# Patient Record
Sex: MALE | Race: WHITE | Employment: FULL TIME | URBAN - METROPOLITAN AREA
[De-identification: names, ages, dates, MRNs, and addresses within clinical notes are randomized per-mention and may not be internally consistent; named-entity substitution may affect disease eponyms.]

---

## 2021-11-29 ENCOUNTER — HOSPITAL ENCOUNTER (OUTPATIENT)
Age: 56
Discharge: HOME | End: 2021-11-29
Payer: COMMERCIAL

## 2021-11-29 ENCOUNTER — HOSPITAL ENCOUNTER (OUTPATIENT)
Age: 56
Discharge: HOME | End: 2021-12-01
Payer: COMMERCIAL

## 2021-11-29 ENCOUNTER — HOSPITAL ENCOUNTER (OUTPATIENT)
Dept: GENERAL RADIOLOGY | Age: 56
Discharge: HOME | End: 2021-12-01
Payer: COMMERCIAL

## 2021-11-29 DIAGNOSIS — S70.01XS CONTUSION OF RIGHT HIP, SEQUELA: ICD-10-CM

## 2021-11-29 LAB
ALBUMIN SERPL-MCNC: 4.8 G/DL (ref 3.5–5.2)
ALP BLD-CCNC: 92 U/L (ref 40–129)
ALT SERPL-CCNC: 22 U/L (ref 0–40)
AST SERPL-CCNC: 20 U/L (ref 0–39)
BASOPHILS ABSOLUTE: 0.04 E9/L (ref 0–0.2)
BASOPHILS RELATIVE PERCENT: 0.6 % (ref 0–2)
BILIRUB SERPL-MCNC: 0.7 MG/DL (ref 0–1.2)
BILIRUBIN DIRECT: <0.2 MG/DL (ref 0–0.3)
BILIRUBIN, INDIRECT: NORMAL MG/DL (ref 0–1)
BUN BLDV-MCNC: 21 MG/DL (ref 6–20)
CHOLESTEROL, FASTING: 237 MG/DL (ref 0–199)
CREAT SERPL-MCNC: 1 MG/DL (ref 0.7–1.2)
EKG ATRIAL RATE: 87 BPM
EKG Q-T INTERVAL: 372 MS
EKG QRS DURATION: 92 MS
EKG QTC CALCULATION (BAZETT): 445 MS
EKG R AXIS: 1 DEGREES
EKG T AXIS: -12 DEGREES
EKG VENTRICULAR RATE: 86 BPM
EOSINOPHILS ABSOLUTE: 0.33 E9/L (ref 0.05–0.5)
EOSINOPHILS RELATIVE PERCENT: 4.8 % (ref 0–6)
GFR AFRICAN AMERICAN: >60
GFR NON-AFRICAN AMERICAN: >60 ML/MIN/1.73
GLUCOSE BLD-MCNC: 101 MG/DL (ref 74–99)
HCT VFR BLD CALC: 50.4 % (ref 37–54)
HDLC SERPL-MCNC: 61 MG/DL
HEMOGLOBIN: 17.1 G/DL (ref 12.5–16.5)
IMMATURE GRANULOCYTES #: 0.11 E9/L
IMMATURE GRANULOCYTES %: 1.6 % (ref 0–5)
LDL CHOLESTEROL CALCULATED: 151 MG/DL (ref 0–99)
LYMPHOCYTES ABSOLUTE: 1.93 E9/L (ref 1.5–4)
LYMPHOCYTES RELATIVE PERCENT: 27.9 % (ref 20–42)
MCH RBC QN AUTO: 29.7 PG (ref 26–35)
MCHC RBC AUTO-ENTMCNC: 33.9 % (ref 32–34.5)
MCV RBC AUTO: 87.7 FL (ref 80–99.9)
MONOCYTES ABSOLUTE: 0.61 E9/L (ref 0.1–0.95)
MONOCYTES RELATIVE PERCENT: 8.8 % (ref 2–12)
NEUTROPHILS ABSOLUTE: 3.89 E9/L (ref 1.8–7.3)
NEUTROPHILS RELATIVE PERCENT: 56.3 % (ref 43–80)
PDW BLD-RTO: 12.2 FL (ref 11.5–15)
PLATELET # BLD: 197 E9/L (ref 130–450)
PMV BLD AUTO: 8.7 FL (ref 7–12)
RBC # BLD: 5.75 E12/L (ref 3.8–5.8)
TOTAL PROTEIN: 7.8 G/DL (ref 6.4–8.3)
TRIGLYCERIDE, FASTING: 124 MG/DL (ref 0–149)
VLDLC SERPL CALC-MCNC: 25 MG/DL
WBC # BLD: 6.9 E9/L (ref 4.5–11.5)

## 2021-11-29 PROCEDURE — 36415 COLL VENOUS BLD VENIPUNCTURE: CPT

## 2021-11-29 PROCEDURE — 82947 ASSAY GLUCOSE BLOOD QUANT: CPT

## 2021-11-29 PROCEDURE — 73502 X-RAY EXAM HIP UNI 2-3 VIEWS: CPT

## 2021-11-29 PROCEDURE — 80076 HEPATIC FUNCTION PANEL: CPT

## 2021-11-29 PROCEDURE — 93005 ELECTROCARDIOGRAM TRACING: CPT | Performed by: FAMILY MEDICINE

## 2021-11-29 PROCEDURE — 82565 ASSAY OF CREATININE: CPT

## 2021-11-29 PROCEDURE — 80061 LIPID PANEL: CPT

## 2021-11-29 PROCEDURE — 85025 COMPLETE CBC W/AUTO DIFF WBC: CPT

## 2021-11-29 PROCEDURE — 84520 ASSAY OF UREA NITROGEN: CPT

## 2022-01-02 NOTE — PROGRESS NOTES
[facility] Cardiology consult  Dr. Sravanthi Murray      Reason for Consult: Atrial Fibrillation  Referring Physician: Alonso Watters DO     CHIEF COMPLAINT:   Chief Complaint   Patient presents with    Atrial Fibrillation     CONSULT PER DR. Jeri Torres due to AF and heart murmur. PAtient denies any cp sob or dizziness. HISTORY OF PRESENT ILLNESS:   60year old male with newly diagnosed atrial fibrillation is here to get established with cardiology  Patient denies any chest pain, no shortness of breath, no lightheadedness, no dizziness, no palpitations, no pedal edema, no PND, no orthopnea, no syncope, no presyncopal episodes. Functional capacity is at baseline    Past Medical History:   Diagnosis Date    Acid reflux     Anxiety     Arrhythmia     ATRIAL FIBRILLATION    Diverticulitis          Past Surgical History:   Procedure Laterality Date    BACK SURGERY      laminectomy    COLECTOMY  July 2014    Laparoscopic sigmoid colectomy    COLONOSCOPY      TONSILLECTOMY           Current Outpatient Medications   Medication Sig Dispense Refill    losartan (COZAAR) 50 MG tablet 50 mg daily       sertraline (ZOLOFT) 100 MG tablet take 1 tablet by mouth once daily      ELIQUIS 5 MG TABS tablet take 1 tablet by mouth twice a day      DEXILANT 30 MG CPDR delayed release capsule take 1 capsule by mouth every morning       No current facility-administered medications for this visit. Allergies as of 01/03/2022    (No Known Allergies)       Social History     Socioeconomic History    Marital status:      Spouse name: Not on file    Number of children: Not on file    Years of education: Not on file    Highest education level: Not on file   Occupational History    Not on file   Tobacco Use    Smoking status: Never Smoker    Smokeless tobacco: Never Used   Vaping Use    Vaping Use: Not on file   Substance and Sexual Activity    Alcohol use:  Yes     Alcohol/week: 2.0 standard drinks     Types: 2 Cans of beer per week     Comment: DRINKS DAILY 2 CANS OF BEER DAILY. 1 CUP OF COFFEE A DAY     Drug use: No    Sexual activity: Not on file   Other Topics Concern    Not on file   Social History Narrative    Not on file     Social Determinants of Health     Financial Resource Strain:     Difficulty of Paying Living Expenses: Not on file   Food Insecurity:     Worried About Running Out of Food in the Last Year: Not on file    [place]ust[place] of Food in the Last Year: Not on file   Transportation Needs:     Lack of Transportation (Medical): Not on file    Lack of Transportation (Non-Medical):  Not on file   Physical Activity:     Days of Exercise per Week: Not on file    Minutes of Exercise per Session: Not on file   Stress:     Feeling of Stress : Not on file   Social Connections:     Frequency of Communication with Friends and Family: Not on file    Frequency of Social Gatherings with Friends and Family: Not on file    Attends Sikhism Services: Not on file    Active Member of [address] [place] [address] or Organizations: Not on file    Attends Club or Organization Meetings: Not on file    Marital Status: Not on file   Intimate Partner Violence:     Fear of Current or Ex-Partner: Not on file    Emotionally Abused: Not on file    Physically Abused: Not on file    Sexually Abused: Not on file   Housing Stability:     Unable to Pay for Housing in the Last Year: Not on file    Number of [place] in the Last Year: Not on file    Unstable Housing in the Last Year: Not on file     No family history of early CAD    REVIEW OF SYSTEMS:     CONSTITUTIONAL:  negative for  fevers, chills, sweats and fatigue  EYES:  negative for  double vision, blurred vision and blind spots  HEENT:  negative for  tinnitus, earaches, nasal congestion and epistaxis  RESPIRATORY:  negative for  dry cough, cough with sputum, dyspnea, wheezing and hemoptysis  CARDIOVASCULAR: as per HPI  GASTROINTESTINAL:  negative for nausea, vomiting, diarrhea, constipation, pruritus and jaundice  GENITOURINARY:  negative for frequency, dysuria, nocturia, urinary incontinence and hesitancy  HEMATOLOGIC/LYMPHATIC:  negative for easy bruising, bleeding, lymphadenopathy and petechiae  ALLERGIC/IMMUNOLOGIC:  negative for urticaria, hay fever and angioedema  ENDOCRINE:  negative for heat intolerance, cold intolerance, tremor, hair loss and diabetic symptoms including neither polyuria nor polydipsia nor blurred vision  MUSCULOSKELETAL:  negative for  myalgias, arthralgias, joint swelling, stiff joints and decreased range of motion  NEUROLOGICAL:  negative for memory problems, speech problems, visual disturbance, dysphagia, weakness and numbness      PHYSICAL EXAM:   CONSTITUTIONAL:  awake, alert, cooperative, no apparent distress, and appears stated age  EYES:  lids and lashes normal and pupils equal, round and reactive to light, anicteric sclerae  HEAD:  normocepalic, without obvious abnormality, atraumatic, pink, moist mucous membranes. NECK:  Supple, symmetrical, trachea midline, no adenopathy, thyroid symmetric, not enlarged and no tenderness, skin normal  HEMATOLOGIC/LYMPHATICS:  no cervical lymphadenopathy and no supraclavicular lymphadenopathy  LUNGS:  No increased work of breathing, good air exchange, clear to auscultation bilaterally, no crackles or wheezing  CARDIOVASCULAR:  Normal apical impulse, irregularly irregular, normal S1 and S2, no S3 or S4, no JVD, no carotid bruit, no pedal edema, good carotid upstroke bilaterally. ABDOMEN:  Soft, nontender, no masses, no hepatomegaly or splenomegaly, BS+  CHEST: nontender to palpation, expands symmetrically  MUSCULOSKELETAL:  No clubbing no cyanosis. there is no redness, warmth, or swelling of the joints  full range of motion noted  NEUROLOGIC:  Alert, awake,oriented x3.   SKIN:  no bruising or bleeding, normal skin color, texture, turgor and no redness, warmth, or swelling        /84 (Site: Left Upper Arm, Position: long-term chronic anticoagulation for now, treatment options including rate versus rhythm control, ablation, discussed with him at length, will start with cardioversion for now. 2.  Hypertension: Controlled  3. Chronic anticoagulation    RECOMMENDATIONS:   1. Toprol-XL 50 mg 1 by mouth daily  2. Decrease losartan to 25 mg 1 by mouth daily  3. Continue Eliquis for now  4.  Echocardiogram  5.  DC cardioversion in 2 weeks (4 weeks after starting his Eliquis)  6. Recommend sleep studies to rule out sleep apnea  7. TSH  8. Follow-up with Dr. Momo Lopez as scheduled  9. Follow-up with Dr. Musa Parra after his cardioversion    I have reviewed my findings and recommendations with patient    Thank you for the consult  Electronically signed by Hermelindo Santiago MD on 1/3/2022 at 11:49 AM      NOTE: This report was transcribed using voice recognition software.  Every effort was made to ensure accuracy; however, inadvertent computerized transcription errors may be present

## 2022-01-03 ENCOUNTER — OFFICE VISIT (OUTPATIENT)
Dept: CARDIOLOGY CLINIC | Age: 57
End: 2022-01-03
Payer: COMMERCIAL

## 2022-01-03 VITALS
SYSTOLIC BLOOD PRESSURE: 136 MMHG | HEART RATE: 96 BPM | HEIGHT: 70 IN | WEIGHT: 244 LBS | DIASTOLIC BLOOD PRESSURE: 84 MMHG | BODY MASS INDEX: 34.93 KG/M2

## 2022-01-03 DIAGNOSIS — I48.91 ATRIAL FIBRILLATION, UNSPECIFIED TYPE (HCC): Primary | ICD-10-CM

## 2022-01-03 PROCEDURE — 99214 OFFICE O/P EST MOD 30 MIN: CPT | Performed by: INTERNAL MEDICINE

## 2022-01-03 PROCEDURE — 93000 ELECTROCARDIOGRAM COMPLETE: CPT | Performed by: INTERNAL MEDICINE

## 2022-01-03 RX ORDER — SERTRALINE HYDROCHLORIDE 100 MG/1
TABLET, FILM COATED ORAL
COMMUNITY
Start: 2021-12-10

## 2022-01-03 RX ORDER — APIXABAN 5 MG/1
TABLET, FILM COATED ORAL
COMMUNITY
Start: 2021-12-06 | End: 2022-01-20

## 2022-01-03 RX ORDER — LOSARTAN POTASSIUM 50 MG/1
50 TABLET ORAL DAILY
COMMUNITY
Start: 2021-12-27

## 2022-01-20 NOTE — PROGRESS NOTES
[address] [facility]                                                                                                                    PRE OP INSTRUCTIONS FOR  Elke Escobar        Date: 1/20/2022    Date of surgery: 1/21/2022   Arrival Time: Hospital will call you between 5pm and 7pm with your final arrival time for surgery    1. Do not eat or drink anything after   Midnight   prior to surgery. This includes no water, chewing gum, mints or ice chips. 2. Take the following medications with a small sip of water on the morning of Surgery:  Take am meds eliquis zoloft losartan and dexilant sip water    3. Diabetics may take evening dose of insulin but none after midnight. If you feel symptomatic or low blood sugar morning of surgery drink 1-2 ounces of apple juice only. 4. Aspirin, Ibuprofen, Advil, Naproxen, Vitamin E and other Anti-inflammatory products should be stopped  before surgery  as directed by your physician. Take Tylenol only unless instructed otherwise by your surgeon. 5. Check with your Doctor regarding stopping Plavix, Coumadin, Lovenox, Eliquis, Effient, or other blood thinners. 6. Do not smoke,use illicit drugs and do not drink any alcoholic beverages 24 hours prior to surgery. 7. You may brush your teeth the morning of surgery. DO NOT SWALLOW WATER    8. You MUST make arrangements for a responsible adult to take you home after your surgery. You will not be allowed to leave alone or drive yourself home. It is strongly suggested someone stay with you the first 24 hrs. Your surgery will be cancelled if you do not have a ride home. 9. PEDIATRIC PATIENTS ONLY:  A parent/legal guardian must accompany a child scheduled for surgery and plan to stay at the hospital until the child is discharged. Please do not bring other children with you.     10. Please wear simple, loose fitting clothing to the hospital.  Do not bring valuables (money, credit cards, checkbooks, etc.) Do not wear any makeup (including no eye makeup) or nail polish on your fingers or toes. 11. DO NOT wear any jewelry or piercings on day of surgery. All body piercing jewelry must be removed. 12. Shower the night before surgery with __x_Antibacterial soap /HUGO WIPES________    13. TOTAL JOINT REPLACEMENT/HYSTERECTOMY PATIENTS ONLY---Remember to bring Blood Bank bracelet to the hospital on the day of surgery. 14. If you have a Living Will and Durable Power of  for Healthcare, please bring in a copy. 15. If appropriate bring crutches, inspirex, WALKER, CANE etc... 12. Notify your Surgeon if you develop any illness between now and surgery time, cough, cold, fever, sore throat, nausea, vomiting, etc.  Please notify your surgeon if you experience dizziness, shortness of breath or blurred vision between now & the time of your surgery. 17. If you have ___dentures, they will be removed before going to the OR; we will provide you a container. If you wear ___contact lenses or ___glasses, they will be removed; please bring a case for them. 18. To provide excellent care visitors will be limited to 2 in the room at any given time. 19. Please bring picture ID and insurance card. 20. Sleep apnea patients need to bring CPAP AND SETTINGS to hospital on day of surgery. 21. During flu season no children under the age of 15 are permitted in the hospital for the safety of all patients. 22. Other               Please call AMBULATORY CARE if you have any further questions.    [facility]     [address]

## 2022-01-21 ENCOUNTER — ANESTHESIA (OUTPATIENT)
Dept: POSTOP/PACU | Age: 57
End: 2022-01-21

## 2022-01-21 ENCOUNTER — ANESTHESIA EVENT (OUTPATIENT)
Dept: POSTOP/PACU | Age: 57
End: 2022-01-21

## 2022-01-21 ENCOUNTER — TELEPHONE (OUTPATIENT)
Dept: CARDIOLOGY CLINIC | Age: 57
End: 2022-01-21

## 2022-01-21 ENCOUNTER — HOSPITAL ENCOUNTER (OUTPATIENT)
Dept: POSTOP/PACU | Age: 57
Setting detail: OUTPATIENT SURGERY
Discharge: HOME | End: 2022-01-21
Payer: COMMERCIAL

## 2022-01-21 VITALS
RESPIRATION RATE: 3 BRPM | SYSTOLIC BLOOD PRESSURE: 122 MMHG | OXYGEN SATURATION: 98 % | DIASTOLIC BLOOD PRESSURE: 88 MMHG

## 2022-01-21 VITALS
DIASTOLIC BLOOD PRESSURE: 83 MMHG | TEMPERATURE: 97 F | SYSTOLIC BLOOD PRESSURE: 148 MMHG | BODY MASS INDEX: 34.22 KG/M2 | RESPIRATION RATE: 16 BRPM | HEART RATE: 73 BPM | WEIGHT: 239 LBS | OXYGEN SATURATION: 97 % | HEIGHT: 70 IN

## 2022-01-21 LAB
EKG ATRIAL RATE: 80 BPM
EKG ATRIAL RATE: 89 BPM
EKG P AXIS: 61 DEGREES
EKG P-R INTERVAL: 224 MS
EKG Q-T INTERVAL: 386 MS
EKG Q-T INTERVAL: 390 MS
EKG QRS DURATION: 100 MS
EKG QRS DURATION: 94 MS
EKG QTC CALCULATION (BAZETT): 448 MS
EKG QTC CALCULATION (BAZETT): 449 MS
EKG R AXIS: 6 DEGREES
EKG R AXIS: 8 DEGREES
EKG T AXIS: -13 DEGREES
EKG T AXIS: 9 DEGREES
EKG VENTRICULAR RATE: 80 BPM
EKG VENTRICULAR RATE: 81 BPM

## 2022-01-21 PROCEDURE — 2580000003 HC RX 258: Performed by: ANESTHESIOLOGY

## 2022-01-21 PROCEDURE — 3700000001 HC ADD 15 MINUTES (ANESTHESIA)

## 2022-01-21 PROCEDURE — 7100000011 HC PHASE II RECOVERY - ADDTL 15 MIN

## 2022-01-21 PROCEDURE — 7100000010 HC PHASE II RECOVERY - FIRST 15 MIN

## 2022-01-21 PROCEDURE — 92960 CARDIOVERSION ELECTRIC EXT: CPT

## 2022-01-21 PROCEDURE — 99219 PR INITIAL OBSERVATION CARE/DAY 50 MINUTES: CPT | Performed by: INTERNAL MEDICINE

## 2022-01-21 PROCEDURE — 93005 ELECTROCARDIOGRAM TRACING: CPT | Performed by: ANESTHESIOLOGY

## 2022-01-21 PROCEDURE — 7100000000 HC PACU RECOVERY - FIRST 15 MIN

## 2022-01-21 PROCEDURE — 93005 ELECTROCARDIOGRAM TRACING: CPT | Performed by: INTERNAL MEDICINE

## 2022-01-21 PROCEDURE — 3700000000 HC ANESTHESIA ATTENDED CARE

## 2022-01-21 PROCEDURE — 6360000002 HC RX W HCPCS

## 2022-01-21 PROCEDURE — 7100000001 HC PACU RECOVERY - ADDTL 15 MIN

## 2022-01-21 RX ORDER — METOPROLOL SUCCINATE 50 MG/1
50 TABLET, EXTENDED RELEASE ORAL DAILY
Qty: 90 TABLET | Refills: 3 | Status: SHIPPED | OUTPATIENT
Start: 2022-01-21

## 2022-01-21 RX ORDER — SODIUM CHLORIDE, SODIUM LACTATE, POTASSIUM CHLORIDE, CALCIUM CHLORIDE 600; 310; 30; 20 MG/100ML; MG/100ML; MG/100ML; MG/100ML
INJECTION, SOLUTION INTRAVENOUS CONTINUOUS
Status: DISCONTINUED | OUTPATIENT
Start: 2022-01-21 | End: 2022-01-22 | Stop reason: HOSPADM

## 2022-01-21 RX ORDER — PROPOFOL 10 MG/ML
INJECTION, EMULSION INTRAVENOUS PRN
Status: DISCONTINUED | OUTPATIENT
Start: 2022-01-21 | End: 2022-01-21 | Stop reason: SDUPTHER

## 2022-01-21 RX ORDER — SODIUM CHLORIDE 0.9 % (FLUSH) 0.9 %
5-40 SYRINGE (ML) INJECTION PRN
Status: DISCONTINUED | OUTPATIENT
Start: 2022-01-21 | End: 2022-01-22 | Stop reason: HOSPADM

## 2022-01-21 RX ORDER — SODIUM CHLORIDE 0.9 % (FLUSH) 0.9 %
5-40 SYRINGE (ML) INJECTION EVERY 12 HOURS SCHEDULED
Status: DISCONTINUED | OUTPATIENT
Start: 2022-01-21 | End: 2022-01-22 | Stop reason: HOSPADM

## 2022-01-21 RX ORDER — SODIUM CHLORIDE 9 MG/ML
25 INJECTION, SOLUTION INTRAVENOUS PRN
Status: DISCONTINUED | OUTPATIENT
Start: 2022-01-21 | End: 2022-01-22 | Stop reason: HOSPADM

## 2022-01-21 RX ADMIN — PROPOFOL 180 MG: 10 INJECTION, EMULSION INTRAVENOUS at 12:46

## 2022-01-21 RX ADMIN — SODIUM CHLORIDE, POTASSIUM CHLORIDE, SODIUM LACTATE AND CALCIUM CHLORIDE: 600; 310; 30; 20 INJECTION, SOLUTION INTRAVENOUS at 11:14

## 2022-01-21 ASSESSMENT — PAIN SCALES - GENERAL
PAINLEVEL_OUTOF10: 0

## 2022-01-21 ASSESSMENT — PAIN - FUNCTIONAL ASSESSMENT
PAIN_FUNCTIONAL_ASSESSMENT: PREVENTS OR INTERFERES SOME ACTIVE ACTIVITIES AND ADLS
PAIN_FUNCTIONAL_ASSESSMENT: 0-10

## 2022-01-21 ASSESSMENT — PAIN DESCRIPTION - DESCRIPTORS: DESCRIPTORS: ACHING;THROBBING

## 2022-01-21 NOTE — H&P
H&P    Name: Drexel Sandifer    Age: 60 y.o. Date of Admission: 1/21/2022 10:19 AM    Date of Service: 1/21/2022    History of Present Illness: 57-year-old male with history of atrial fibrillation who presented for Elective cardioversion. He reported no clinical changes since last seen at the office. He reported taking his Eliquis without missing any dose. He was supposed to be on metoprolol but not on this medication and his primary cardiologist office was notified to update them and to refill this medication. Review of Systems:   Cardiac: As per HPI  General: Denies fever or chills  Pulmonary: As per HPI  HEENT: Denies runny nose  GI: No complaints  : No complaints  Endocrine: Denies night sweats  Musculoskeletal: No complaints  Skin: Dry skin  Neuro: No complaints  Psych: Denies depression    Past Medical History:  Past Medical History:   Diagnosis Date    Acid reflux     Anxiety     Arrhythmia     ATRIAL FIBRILLATION    Diverticulitis     Hypertension        Past Surgical History:  Past Surgical History:   Procedure Laterality Date    BACK SURGERY      laminectomy    COLECTOMY  July 2014    Laparoscopic sigmoid colectomy    COLONOSCOPY      TONSILLECTOMY         Family History:  History reviewed. No pertinent family history. Social History:  Social History     Socioeconomic History    Marital status:      Spouse name: Not on file    Number of children: Not on file    Years of education: Not on file    Highest education level: Not on file   Occupational History    Not on file   Tobacco Use    Smoking status: Never Smoker    Smokeless tobacco: Never Used   Vaping Use    Vaping Use: Never used   Substance and Sexual Activity    Alcohol use: Yes     Alcohol/week: 2.0 standard drinks     Types: 2 Cans of beer per week     Comment: DRINKS DAILY 2 CANS OF BEER DAILY.     1 CUP OF COFFEE A DAY     Drug use: No    Sexual activity: Not on file   Other Topics Concern    Not on file   Social History Narrative    Not on file     Social Determinants of Health     Financial Resource Strain:     Difficulty of Paying Living Expenses: Not on file   Food Insecurity:     Worried About Running Out of Food in the Last Year: Not on file    Agustin of Food in the Last Year: Not on file   Transportation Needs:     Lack of Transportation (Medical): Not on file    Lack of Transportation (Non-Medical): Not on file   Physical Activity:     Days of Exercise per Week: Not on file    Minutes of Exercise per Session: Not on file   Stress:     Feeling of Stress : Not on file   Social Connections:     Frequency of Communication with Friends and Family: Not on file    Frequency of Social Gatherings with Friends and Family: Not on file    Attends Church Services: Not on file    Active Member of [address] [place] [address] [employer] or Organizations: Not on file    Attends Club or Organization Meetings: Not on file    Marital Status: Not on file   Intimate Partner Violence:     Fear of Current or Ex-Partner: Not on file    Emotionally Abused: Not on file    Physically Abused: Not on file    Sexually Abused: Not on file   Housing Stability:     Unable to Pay for Housing in the Last Year: Not on file    Number of [place] in the Last Year: Not on file    Unstable Housing in the Last Year: Not on file       Allergies:  No Known Allergies    Home Medications:  Prior to Admission medications    Medication Sig Start Date End Date Taking?  Authorizing Provider   losartan (COZAAR) 50 MG tablet 50 mg daily  12/27/21  Yes Historical Provider, MD   sertraline (ZOLOFT) 100 MG tablet take 1 tablet by mouth once daily 12/10/21  Yes Historical Provider, MD   DEXILANT 30 MG CPDR delayed release capsule take 1 capsule by mouth every morning 12/20/21  Yes Historical Provider, MD   apixaban (ELIQUIS) 5 MG TABS tablet Take 1 tablet by mouth 2 times daily 1/3/22  Yes Jhony Mendoza MD       Current Medications:  Current Outpatient Medications   Medication Sig Dispense Refill    losartan (COZAAR) 50 MG tablet 50 mg daily       sertraline (ZOLOFT) 100 MG tablet take 1 tablet by mouth once daily      DEXILANT 30 MG CPDR delayed release capsule take 1 capsule by mouth every morning      apixaban (ELIQUIS) 5 MG TABS tablet Take 1 tablet by mouth 2 times daily 60 tablet 0     Current Facility-Administered Medications   Medication Dose Route Frequency Provider Last Rate Last Admin    lactated ringers infusion   IntraVENous Continuous Summer Giordano  mL/hr at 01/21/22 1114 New Bag at 01/21/22 1114    sodium chloride flush 0.9 % injection 5-40 mL  5-40 mL IntraVENous PRN Summer Giordano MD        sodium chloride flush 0.9 % injection 5-40 mL  5-40 mL IntraVENous 2 times per day Azar Ojeda MD        sodium chloride flush 0.9 % injection 5-40 mL  5-40 mL IntraVENous PRN Azar Ojeda MD        0.9 % sodium chloride infusion  25 mL IntraVENous PRN Azar Ojeda MD          lactated ringers 100 mL/hr at 01/21/22 1114    sodium chloride         Physical Exam:  BP (!) 143/88   Pulse 65   Temp 97.1 °F (36.2 °C) (Infrared)   Resp 15   Ht 5' 10\" (1.778 m)   Wt 239 lb (108.4 kg)   SpO2 99%   BMI 34.29 kg/m²   Wt Readings from Last 3 Encounters:   01/21/22 239 lb (108.4 kg)   01/03/22 244 lb (110.7 kg)   06/30/14 211 lb 6 oz (95.9 kg)       Appearance: Alert and oriented x3 not in acute distress. Skin: Dry skin  Head: Atraumatic  Eyes: Intact extraocular muscles   ENMT: Mucous membranes are moist  Neck: Supple  Lungs: Clear to auscultation  Cardiac: Normal S1 and S2  Abdomen: Protuberant  Extremities: Intact range of motion  Neurologic: No focal neurological deficits  Peripheral Pulses: 2+ peripheral pulses    Intake/Output:  No intake or output data in the 24 hours ending 01/21/22 1254  No intake/output data recorded.     Laboratory Tests:  No results for input(s): NA, K, CL, CO2, BUN, CREATININE, GLUCOSE, CALCIUM in the last 72 hours. Lab Results   Component Value Date    MG 2.3 09/12/2017     No results for input(s): ALKPHOS, ALT, AST, PROT, BILITOT, BILIDIR, LABALBU in the last 72 hours. No results for input(s): WBC, RBC, HGB, HCT, MCV, MCH, MCHC, RDW, PLT, MPV in the last 72 hours. No results found for: CKTOTAL, CKMB, CKMBINDEX, TROPONINI  No results for input(s): CKTOTAL, CKMB, CKMBINDEX, TROPHS in the last 72 hours. No results found for: INR, PROTIME  No results found for: TSHFT4, TSH  Lab Results   Component Value Date    LABA1C 5.1 12/06/2017     No results found for: EAG  Lab Results   Component Value Date    CHOL 178 03/06/2013    CHOL 203 (H) 05/27/2011     Lab Results   Component Value Date    TRIG 80 03/06/2013    TRIG 80 05/27/2011     Lab Results   Component Value Date    HDL 61 11/29/2021    HDL 52 12/06/2017    HDL 44.0 03/06/2013     Lab Results   Component Value Date    LDLCALC 151 (H) 11/29/2021    LDLCALC 150 (H) 12/06/2017    LDLCALC 118 (H) 03/06/2013     Lab Results   Component Value Date    LABVLDL 25 11/29/2021    LABVLDL 24 12/06/2017     No results found for: CHOLHDLRATIO  No results for input(s): PROBNP in the last 72 hours. The 10-year ASCVD risk score (Cecily Ellison et al., 2013) is: 6.8%    Values used to calculate the score:      Age: 60 years      Sex: Male      Is Non- : No      Diabetic: No      Tobacco smoker: No      Systolic Blood Pressure: 416 mmHg      Is BP treated: No      HDL Cholesterol: 61 mg/dL      Total Cholesterol: 237 mg/dL    ASSESSMENT / PLAN:  Atrial fibrillation  Successful synchronized biphasic cardioversion of atrial fibrillation into sinus rhythm using 125 J without any complications. Continue Eliquis and beta-blocker  Call your cardiologist office for refill of beta-blocker. Do not drive or operate any machinery within 24 hours after elective cardioversion.       Thank you for allowing me to participate in your patient's care. Please feel free to contact me if you have any questions or concerns.     Davie Lloyd MD  [facility] ([facility]) Cardiology

## 2022-01-21 NOTE — ANESTHESIA PRE PROCEDURE
Diagnosis Code    Diverticulitis large intestine K57.32    Diverticulitis K57.92    colon resection Z90.49       Past Medical History:        Diagnosis Date    Acid reflux     Anxiety     Arrhythmia     ATRIAL FIBRILLATION    Diverticulitis     Hypertension        Past Surgical History:        Procedure Laterality Date    BACK SURGERY      laminectomy    COLECTOMY  July 2014    Laparoscopic sigmoid colectomy    COLONOSCOPY      TONSILLECTOMY         Social History:    Social History     Tobacco Use    Smoking status: Never Smoker    Smokeless tobacco: Never Used   Substance Use Topics    Alcohol use: Yes     Alcohol/week: 2.0 standard drinks     Types: 2 Cans of beer per week     Comment: DRINKS DAILY 2 CANS OF BEER DAILY. 1 CUP OF COFFEE A DAY                                 Counseling given: Not Answered      Vital Signs (Current):   Vitals:    01/20/22 0859 01/21/22 1030 01/21/22 1037   BP:  (!) 157/97 (!) 157/97   Pulse:  89 89   Resp:  16 16   Temp:  97.1 °F (36.2 °C) 97.1 °F (36.2 °C)   TempSrc:  Infrared Infrared   SpO2:  98%    Weight: 244 lb (110.7 kg) 239 lb (108.4 kg) 239 lb (108.4 kg)   Height: 5' 10\" (1.778 m) 5' 10.5\" (1.791 m) 5' 10\" (1.778 m)                                              BP Readings from Last 3 Encounters:   01/21/22 (!) 157/97   01/03/22 136/84   06/30/14 112/76       NPO Status:                                                                                 BMI:   Wt Readings from Last 3 Encounters:   01/21/22 239 lb (108.4 kg)   01/03/22 244 lb (110.7 kg)   06/30/14 211 lb 6 oz (95.9 kg)     Body mass index is 34.29 kg/m².     CBC:   Lab Results   Component Value Date    WBC 6.9 11/29/2021    RBC 5.75 11/29/2021    HGB 17.1 11/29/2021    HCT 50.4 11/29/2021    MCV 87.7 11/29/2021    RDW 12.2 11/29/2021     11/29/2021       CMP:   Lab Results   Component Value Date     09/12/2017    K 5.0 09/12/2017     09/12/2017    CO2 26 09/12/2017    BUN 21 11/29/2021    CREATININE 1.0 11/29/2021    GFRAA >60 11/29/2021    LABGLOM >60 11/29/2021    GLUCOSE 101 11/29/2021    PROT 7.8 11/29/2021    CALCIUM 9.7 09/12/2017    BILITOT 0.7 11/29/2021    ALKPHOS 92 11/29/2021    AST 20 11/29/2021    ALT 22 11/29/2021       POC Tests: No results for input(s): POCGLU, POCNA, POCK, POCCL, POCBUN, POCHEMO, POCHCT in the last 72 hours. Coags: No results found for: PROTIME, INR, APTT    HCG (If Applicable): No results found for: PREGTESTUR, PREGSERUM, HCG, HCGQUANT     ABGs: No results found for: PHART, PO2ART, EXI6XQF, WLO7QDV, BEART, V8OKHBFK     Type & Screen (If Applicable):  No results found for: LABABO, LABRH    Drug/Infectious Status (If Applicable):  No results found for: HIV, HEPCAB    COVID-19 Screening (If Applicable): No results found for: COVID19        Anesthesia Evaluation  Patient summary reviewed  Airway: Mallampati: II  TM distance: >3 FB   Neck ROM: full  Mouth opening: > = 3 FB Dental:          Pulmonary:Negative Pulmonary ROS breath sounds clear to auscultation                             Cardiovascular:Negative CV ROS    (+) hypertension:,         Rhythm: regular  Rate: normal           Beta Blocker:  Not on Beta Blocker         Neuro/Psych:   Negative Neuro/Psych ROS              GI/Hepatic/Renal:   (+) GERD:,           Endo/Other:    (+) blood dyscrasia: anticoagulation therapy:., .                 Abdominal:       Abdomen: soft. Vascular: Other Findings:             Anesthesia Plan      MAC     ASA 2       Induction: intravenous. Anesthetic plan and risks discussed with patient. Plan discussed with CRNA.                   Lexie Cr MD   1/21/2022  LUIS CARLOS [facility] - [facility]ID

## 2022-01-21 NOTE — TELEPHONE ENCOUNTER
----- Message from [address] [place] [address] sent at 1/21/2022 12:47 PM EST -----  Regarding: FW: metoprolo    ----- Message -----  From: Alka Skelton MD  Sent: 1/21/2022  12:40 PM EST  To: Compa Reece  Subject: metoprolo                                        Please notifyt Dr. Weston Deleon office that this pt came for [facility] and has not yet  his metoprolol. Please advice them to encourage pt to pick it up.  thanks

## 2022-01-21 NOTE — PROCEDURES
: Khadijah Moura MD     Procedure Date: 1/21/2022     PROCEDURE(S): Synchronized direct-current cardioversion. INDICATION(S): Atrial fibrillation. CLINICAL SUMMARY:  78-year-old male with atrial fibrillation who presented for elective cardioversion. The risks and benefits of synchronized direct current cardioversion and moderate sedation were discussed with the patient today to include but not limited skin burn, stroke/CVA, allergic reaction,breathing problems that require a breathing tube, infection, embolus, arrhythmias, MI, external pacing, temporary and or permanent pacemaker use of ACLS, death. It was discussed that there is a <1% risk of significant complication associated with the synchronized direct current cardioversion and moderate sedation. The patient verbalizes the  Understanding of these and other unnamed risks and wishes to proceed. The patient is on anticoagulation with Eliquis and status of anticoagulant agent was confirmed. The patient is on above-mentioned anticoagulation for at least one month without missing a dose. DESCRIPTION OF PROCEDURE(S):  After taking written informed consent, the patient was brought to the procedure room and the pads were placed appropriately. NPO status was confirmed with the patient. A proper time-out was performed. The patient was sedated by the anesthesia service. Patient then underwent synchronized biphasic cardioversion with 120 J with  successful conversion to normal sinus rhythm. The patient tolerated the procedure very well without any complication. PLAN:  Successful biphasic synchronized cardioversion of atrial fibrillation into sinus rhythm using 120 J. There was no complications. Continue Eliquis and beta-blocker as recommended by your cardiologist.  Please  the beta-blocker ordered by your cardiologist at the pharmacy.           Luke Desouza MD  1/21/2022  1:12 PM

## 2022-01-21 NOTE — PROGRESS NOTES
Pt requesting discharge, met criteria. Called Dr. Paul Senior re: not having a prescription for metoprolol. Ambulated without difficulty. Instructions reviewed.

## 2022-01-22 NOTE — ANESTHESIA POSTPROCEDURE EVALUATION
Department of Anesthesiology  Postprocedure Note    Patient: Bahman Obregon  MRN: [id number]  YOB: 1965  Date of evaluation: 1/21/2022  Time:  7:46 PM     Procedure Summary     Date: 01/21/22 Room / Location: [address] [place] [address] PACU    Anesthesia Start: 8752 Anesthesia Stop: 3881    Procedure: CARDIOVERSION Diagnosis: Encounter for cardioversion procedure    Scheduled Providers:  Responsible Provider: Kaela Laguna MD    Anesthesia Type: MAC ASA Status: 2          Anesthesia Type: MAC    Lisa Phase I: Lisa Score: 10    Lisa Phase II: Lisa Score: 10    Last vitals: Reviewed and per EMR flowsheets.        Anesthesia Post Evaluation    Patient location during evaluation: PACU  Patient participation: complete - patient participated  Level of consciousness: awake  Pain score: 3  Airway patency: patent  Nausea & Vomiting: no nausea  Complications: no  Cardiovascular status: blood pressure returned to baseline  Respiratory status: acceptable  Hydration status: euvolemic

## 2022-01-24 ENCOUNTER — TELEPHONE (OUTPATIENT)
Dept: CARDIOLOGY CLINIC | Age: 57
End: 2022-01-24

## 2022-03-15 ENCOUNTER — HOSPITAL ENCOUNTER (OUTPATIENT)
Dept: CARDIOLOGY | Age: 57
Discharge: HOME | End: 2022-03-15
Payer: COMMERCIAL

## 2022-03-15 DIAGNOSIS — I48.91 ATRIAL FIBRILLATION, UNSPECIFIED TYPE (HCC): ICD-10-CM

## 2022-03-15 LAB
LV EF: 55 %
LVEF MODALITY: NORMAL

## 2022-03-15 PROCEDURE — 93306 TTE W/DOPPLER COMPLETE: CPT

## 2022-06-09 ENCOUNTER — OFFICE VISIT (OUTPATIENT)
Dept: CARDIOLOGY CLINIC | Age: 57
End: 2022-06-09
Payer: COMMERCIAL

## 2022-06-09 VITALS
RESPIRATION RATE: 16 BRPM | SYSTOLIC BLOOD PRESSURE: 108 MMHG | BODY MASS INDEX: 35.07 KG/M2 | HEIGHT: 70 IN | HEART RATE: 88 BPM | WEIGHT: 245 LBS | DIASTOLIC BLOOD PRESSURE: 62 MMHG

## 2022-06-09 DIAGNOSIS — I48.91 ATRIAL FIBRILLATION, UNSPECIFIED TYPE (HCC): Primary | ICD-10-CM

## 2022-06-09 PROCEDURE — 99213 OFFICE O/P EST LOW 20 MIN: CPT | Performed by: INTERNAL MEDICINE

## 2022-06-09 PROCEDURE — 93000 ELECTROCARDIOGRAM COMPLETE: CPT | Performed by: INTERNAL MEDICINE

## 2022-06-09 RX ORDER — RIVAROXABAN 20 MG/1
TABLET, FILM COATED ORAL
COMMUNITY
Start: 2022-05-24

## 2022-06-24 NOTE — PROGRESS NOTES
[facility] Cardiology consult  Dr. Patricia Meyer      Reason for Consult: Atrial Fibrillation  Referring Physician: Carina Simon DO     CHIEF COMPLAINT:   Chief Complaint   Patient presents with    Heart Problem     6 month, afib. Patient c/o SOBOE        HISTORY OF PRESENT ILLNESS:   60year old male with newly diagnosed atrial fibrillation s/p cardioversion is here for follow-up appointment  Shortness of breath, palpitations, patient denies any chest pain, no lightheadedness, no dizziness, no pedal edema, no PND, no orthopnea, no syncope, no presyncopal episodes. Functional capacity is at baseline    Past Medical History:   Diagnosis Date    Acid reflux     Anxiety     Arrhythmia     ATRIAL FIBRILLATION    Diverticulitis     Hypertension          Past Surgical History:   Procedure Laterality Date    BACK SURGERY      laminectomy    COLECTOMY  July 2014    Laparoscopic sigmoid colectomy    COLONOSCOPY      TONSILLECTOMY           Current Outpatient Medications   Medication Sig Dispense Refill    XARELTO 20 MG TABS tablet take 1 tablet by mouth once daily      metoprolol succinate (TOPROL XL) 50 MG extended release tablet Take 1 tablet by mouth daily 90 tablet 3    losartan (COZAAR) 50 MG tablet 50 mg daily       sertraline (ZOLOFT) 100 MG tablet take 1 tablet by mouth once daily      DEXILANT 30 MG CPDR delayed release capsule take 1 capsule by mouth every morning      apixaban (ELIQUIS) 5 MG TABS tablet Take 1 tablet by mouth 2 times daily (Patient not taking: Reported on 6/9/2022) 60 tablet 0     No current facility-administered medications for this visit.          Allergies as of 06/09/2022    (No Known Allergies)       Social History     Socioeconomic History    Marital status:      Spouse name: Not on file    Number of children: Not on file    Years of education: Not on file    Highest education level: Not on file   Occupational History    Not on file   Tobacco Use    Smoking status: hemoptysis  GASTROINTESTINAL:  negative for nausea, vomiting, diarrhea, constipation, pruritus and jaundice  HEMATOLOGIC/LYMPHATIC:  negative for easy bruising, bleeding, lymphadenopathy and petechiae  ENDOCRINE:  negative for heat intolerance, cold intolerance, tremor, hair loss and diabetic symptoms including neither polyuria nor polydipsia nor blurred vision  MUSCULOSKELETAL:  negative for  myalgias, arthralgias, joint swelling, stiff joints and decreased range of motion  NEUROLOGICAL:  negative for memory problems, speech problems, visual disturbance, dysphagia, weakness and numbness      PHYSICAL EXAM:   CONSTITUTIONAL:  awake, alert, cooperative, no apparent distress, and appears stated age  HEAD:  normocepalic, without obvious abnormality, atraumatic, pink, moist mucous membranes. NECK:  Supple, symmetrical, trachea midline, no adenopathy, thyroid symmetric, not enlarged and no tenderness, skin normal  HEMATOLOGIC/LYMPHATICS:  no cervical lymphadenopathy and no supraclavicular lymphadenopathy  LUNGS:  No increased work of breathing, good air exchange, clear to auscultation bilaterally, no crackles or wheezing  CARDIOVASCULAR:  Normal apical impulse, irregularly irregular, normal S1 and S2, no S3 or S4, no JVD, no carotid bruit, no pedal edema, good carotid upstroke bilaterally. ABDOMEN:  Soft, nontender, no masses, no hepatomegaly or splenomegaly, BS+  CHEST: nontender to palpation, expands symmetrically  MUSCULOSKELETAL:  No clubbing no cyanosis. there is no redness, warmth, or swelling of the joints  full range of motion noted  NEUROLOGIC:  Alert, awake,oriented x3.   SKIN:  no bruising or bleeding, normal skin color, texture, turgor and no redness, warmth, or swelling    /62   Pulse 88   Resp 16   Ht 5' 10\" (1.778 m)   Wt 245 lb (111.1 kg)   BMI 35.15 kg/m²     DATA:   I personally reviewed the visit EKG with the following interpretation: Atrial fibrillation, controlled ventricular response, possible old inferior wall MI age undetermined    EKG 11/29/21 Atrial fibrillation with PVC  Inferior infarct , age undetermined  Abnormal ECG  No previous ECGs available    ECHO: 3/15/2022,   Summary   No previous echo for comparison. Technically adequate study. Left ventricle size is normal.   Normal left ventricular wall thickness. Ejection fraction is visually estimated at 55%. Mild mitral regurgitation is present. Physiologic and/or trace tricuspid regurgitation. RVSP is normal.  Stress Test  Angiography:   Cardiology Labs: BMP:    Lab Results   Component Value Date     09/12/2017    K 5.0 09/12/2017     09/12/2017    CO2 26 09/12/2017    BUN 21 11/29/2021    CREATININE 1.0 11/29/2021     CMP:    Lab Results   Component Value Date     09/12/2017    K 5.0 09/12/2017     09/12/2017    CO2 26 09/12/2017    BUN 21 11/29/2021    CREATININE 1.0 11/29/2021    PROT 7.8 11/29/2021     CBC:    Lab Results   Component Value Date    WBC 6.9 11/29/2021    RBC 5.75 11/29/2021    HGB 17.1 11/29/2021    HCT 50.4 11/29/2021    MCV 87.7 11/29/2021    RDW 12.2 11/29/2021     11/29/2021     PT/INR:  No results found for: PTINR  PT/INR Warfarin:  No components found for: PTPATWAR, PTINRWAR  PTT:  No results found for: APTT  PTT Heparin:  No components found for: APTTHEP  Magnesium:    Lab Results   Component Value Date    MG 2.3 09/12/2017     TSH:  No results found for: TSH  TROPONIN:  No components found for: TROP  BNP:  No results found for: BNP  FASTING LIPID PANEL:    Lab Results   Component Value Date    CHOL 178 03/06/2013    HDL 61 11/29/2021    TRIG 80 03/06/2013     No orders to display     I have personally reviewed the laboratory, cardiac diagnostic and radiographic testing as outlined above:      IMPRESSION:  1. Atrial fibrillation: S/p cardioversion, will continue current treatment, discussed ablation with him  2. Mitral valve regurgitation: Mild  3.   Hypertension: Controlled  4. Chronic anticoagulation    RECOMMENDATIONS:   1. Continue current treatment  2. Recommend sleep studies to rule out sleep apnea  3. Follow-up with Dr. Josephine Horton as scheduled  4. Follow-up with Dr. Luis Antonio Connolly in 6 months, sooner if symptomatic for any reason    I have reviewed my findings and recommendations with patient    Electronically signed by Olena Beckman MD on 6/23/2022 at 9:09 PM      NOTE: This report was transcribed using voice recognition software.  Every effort was made to ensure accuracy; however, inadvertent computerized transcription errors may be present s/p C3-6 decompression

## 2022-09-06 ENCOUNTER — OFFICE VISIT (OUTPATIENT)
Dept: CARDIOLOGY CLINIC | Age: 57
End: 2022-09-06
Payer: COMMERCIAL

## 2022-09-06 VITALS
SYSTOLIC BLOOD PRESSURE: 126 MMHG | BODY MASS INDEX: 34.3 KG/M2 | HEART RATE: 66 BPM | RESPIRATION RATE: 16 BRPM | WEIGHT: 245 LBS | HEIGHT: 71 IN | DIASTOLIC BLOOD PRESSURE: 86 MMHG

## 2022-09-06 DIAGNOSIS — I48.91 ATRIAL FIBRILLATION, UNSPECIFIED TYPE (HCC): Primary | ICD-10-CM

## 2022-09-06 PROCEDURE — 93000 ELECTROCARDIOGRAM COMPLETE: CPT | Performed by: INTERNAL MEDICINE

## 2022-09-06 PROCEDURE — 99214 OFFICE O/P EST MOD 30 MIN: CPT | Performed by: INTERNAL MEDICINE

## 2022-09-06 RX ORDER — PANTOPRAZOLE SODIUM 40 MG/1
TABLET, DELAYED RELEASE ORAL
COMMUNITY
Start: 2022-08-14

## 2022-09-06 RX ORDER — AMIODARONE HYDROCHLORIDE 200 MG/1
200 TABLET ORAL SEE ADMIN INSTRUCTIONS
Qty: 30 TABLET | Refills: 0 | Status: SHIPPED
Start: 2022-09-06 | End: 2022-09-16

## 2022-09-07 ENCOUNTER — HOSPITAL ENCOUNTER (OUTPATIENT)
Dept: PREADMISSION TESTING | Age: 57
Discharge: HOME | End: 2022-09-07

## 2022-09-07 RX ORDER — SODIUM CHLORIDE, SODIUM LACTATE, POTASSIUM CHLORIDE, CALCIUM CHLORIDE 600; 310; 30; 20 MG/100ML; MG/100ML; MG/100ML; MG/100ML
INJECTION, SOLUTION INTRAVENOUS CONTINUOUS
Status: CANCELLED | OUTPATIENT
Start: 2022-09-08

## 2022-09-07 NOTE — PROGRESS NOTES
[address]                                                                                                                     PRE OP INSTRUCTIONS FOR  Radha Julian        Date: 9/7/2022    Date and time of surgery: 09/08/2022   Arrival Time:     Do not eat or drink anything after 12 midnight prior to surgery. This includes no water, chewing gum, mints or ice chips. Take the following pills with a small sip of water on the morning of Surgery: xarelto, amiodarone, metoprolol     Diabetics may take evening dose of insulin but none after midnight. If you feel symptomatic or low blood sugar take 1-2 ounces of apple juice only. Aspirin, Ibuprofen, Advil, Naproxen, Vitamin E and other Anti-inflammatory products should be stopped  before surgery  as directed by your physician. Check with your Doctor regarding stopping Plavix, Coumadin, Lovenox, Fragmin or other blood thinners. Do not smoke,use illicit drugs and do not drink any alcoholic beverages 24 hours prior to surgery. You may brush your teeth and gargle the morning of surgery. DO NOT SWALLOW WATER    You MUST make arrangements for a responsible adult to take you home after your surgery. You will not be allowed to leave alone or drive yourself home. It is strongly suggested someone stay with you the first 24 hrs. Your surgery will be cancelled if you do not have a ride home. A parent/legal guardian must accompany a child scheduled for surgery and plan to stay at the hospital until the child is discharged. Please do not bring other children with you. Please wear simple, loose fitting clothing to the hospital.  Cecy Sos not bring valuables (money, credit cards, checkbooks, etc.) Do not wear any makeup (including no eye makeup) or nail polish on your fingers or toes. DO NOT wear any jewelry or piercings on day of surgery. All body piercing jewelry must be removed.     Shower the night before surgery with ___Antibacterial soap ___Hibiclens. Remember to bring Blood Bank bracelet to the hospital on the day of surgery. If you have a Living Will and Durable Power of  for Healthcare, please bring in a copy. If appropriate bring crutches, inspirex, etc... Notify your Surgeon if you develop any illness between now and surgery time, cough, cold, fever, sore throat, nausea, vomiting, etc.  Please notify your surgeon if you experience dizziness, shortness of breath or blurred vision between now & the time of your surgery. If you have ___dentures, they will be removed before going to the OR; we will provide you a container. If you wear ___contact lenses or ___glasses, they will be removed; please bring a case for them. To provide excellent care visitors will be limited to one in the room at any given time. Please bring picture ID and insurance card. Sleep apnea patients need to bring CPAP to hospital on day of surgery. Visit our web site for additional information: ThemeContent.si. org/ho_sjhc. [email or web address]    During flu season no children under the age of 15 are permitted in the hospital for the safety of all patients. Other                  Please call pre admission testing if you have any further questions.    [facility]     [address]

## 2022-09-08 ENCOUNTER — HOSPITAL ENCOUNTER (OUTPATIENT)
Dept: POSTOP/PACU | Age: 57
Setting detail: OUTPATIENT SURGERY
Discharge: HOME | End: 2022-09-08
Payer: COMMERCIAL

## 2022-09-08 ENCOUNTER — ANESTHESIA EVENT (OUTPATIENT)
Dept: POSTOP/PACU | Age: 57
End: 2022-09-08

## 2022-09-08 ENCOUNTER — ANESTHESIA (OUTPATIENT)
Dept: POSTOP/PACU | Age: 57
End: 2022-09-08

## 2022-09-08 VITALS
WEIGHT: 241 LBS | DIASTOLIC BLOOD PRESSURE: 84 MMHG | TEMPERATURE: 97.6 F | HEART RATE: 73 BPM | RESPIRATION RATE: 16 BRPM | OXYGEN SATURATION: 99 % | HEIGHT: 70 IN | SYSTOLIC BLOOD PRESSURE: 135 MMHG | BODY MASS INDEX: 34.5 KG/M2

## 2022-09-08 PROCEDURE — 92960 CARDIOVERSION ELECTRIC EXT: CPT | Performed by: INTERNAL MEDICINE

## 2022-09-08 PROCEDURE — 2580000003 HC RX 258: Performed by: ANESTHESIOLOGY

## 2022-09-08 PROCEDURE — 92960 CARDIOVERSION ELECTRIC EXT: CPT

## 2022-09-08 PROCEDURE — 7100000000 HC PACU RECOVERY - FIRST 15 MIN

## 2022-09-08 PROCEDURE — 7100000010 HC PHASE II RECOVERY - FIRST 15 MIN

## 2022-09-08 PROCEDURE — 3700000000 HC ANESTHESIA ATTENDED CARE

## 2022-09-08 PROCEDURE — 7100000001 HC PACU RECOVERY - ADDTL 15 MIN

## 2022-09-08 PROCEDURE — 7100000011 HC PHASE II RECOVERY - ADDTL 15 MIN

## 2022-09-08 PROCEDURE — 3700000001 HC ADD 15 MINUTES (ANESTHESIA)

## 2022-09-08 PROCEDURE — 6360000002 HC RX W HCPCS: Performed by: NURSE ANESTHETIST, CERTIFIED REGISTERED

## 2022-09-08 RX ORDER — SODIUM CHLORIDE, SODIUM LACTATE, POTASSIUM CHLORIDE, CALCIUM CHLORIDE 600; 310; 30; 20 MG/100ML; MG/100ML; MG/100ML; MG/100ML
INJECTION, SOLUTION INTRAVENOUS CONTINUOUS
Status: DISCONTINUED | OUTPATIENT
Start: 2022-09-08 | End: 2022-09-09 | Stop reason: HOSPADM

## 2022-09-08 RX ORDER — SODIUM CHLORIDE 9 MG/ML
INJECTION, SOLUTION INTRAVENOUS PRN
OUTPATIENT
Start: 2022-09-08

## 2022-09-08 RX ORDER — PROPOFOL 10 MG/ML
INJECTION, EMULSION INTRAVENOUS CONTINUOUS PRN
Status: DISCONTINUED | OUTPATIENT
Start: 2022-09-08 | End: 2022-09-08 | Stop reason: SDUPTHER

## 2022-09-08 RX ORDER — SODIUM CHLORIDE 0.9 % (FLUSH) 0.9 %
5-40 SYRINGE (ML) INJECTION EVERY 12 HOURS SCHEDULED
OUTPATIENT
Start: 2022-09-08

## 2022-09-08 RX ORDER — SODIUM CHLORIDE 0.9 % (FLUSH) 0.9 %
5-40 SYRINGE (ML) INJECTION PRN
OUTPATIENT
Start: 2022-09-08

## 2022-09-08 RX ADMIN — PROPOFOL 150 MCG/KG/MIN: 10 INJECTION, EMULSION INTRAVENOUS at 11:27

## 2022-09-08 RX ADMIN — SODIUM CHLORIDE, POTASSIUM CHLORIDE, SODIUM LACTATE AND CALCIUM CHLORIDE: 600; 310; 30; 20 INJECTION, SOLUTION INTRAVENOUS at 10:14

## 2022-09-08 ASSESSMENT — PAIN - FUNCTIONAL ASSESSMENT: PAIN_FUNCTIONAL_ASSESSMENT: NONE - DENIES PAIN

## 2022-09-08 NOTE — ANESTHESIA POSTPROCEDURE EVALUATION
Department of Anesthesiology  Postprocedure Note    Patient: Jeny Carl  MRN: [id number]  YOB: 1965  Date of evaluation: 9/8/2022      Procedure Summary     Date: 09/08/22 Room / Location: [address] [place] [address] PACU    Anesthesia Start: 1101 Anesthesia Stop: 0897    Procedure: CARDIOVERSION Diagnosis: Encounter for cardioversion procedure    Scheduled Providers:  Responsible Provider: Whit Elder MD    Anesthesia Type: MAC ASA Status: 3          Anesthesia Type: No value filed.     Lisa Phase I: Lisa Score: 10    Lisa Phase II: Lisa Score: 10      Anesthesia Post Evaluation    Patient location during evaluation: PACU  Patient participation: complete - patient participated  Level of consciousness: awake  Airway patency: patent  Nausea & Vomiting: no nausea and no vomiting  Complications: no  Cardiovascular status: hemodynamically stable  Respiratory status: acceptable  Hydration status: euvolemic

## 2022-09-08 NOTE — PROGRESS NOTES
1043-patient arrived in PACU   1122-Dr Janis john in PACU   1125-timeout performed  1127-patient premedicated  1130 Patient shocked in sync mode @ 100 joules, cardioversion unsuccessful  1131 patient shocked in sync mode at 200 joules, cardioversion unsuccessful  1131.  Patient shocked in sync mode at 200 joules, cardioversion unsuccessful  1135-patient resting in PACU, arousable no c/o pain

## 2022-09-08 NOTE — DISCHARGE INSTRUCTIONS
Electrical Cardioversion: What to Expect at [facility]     Electrical cardioversion is a treatment for an abnormal heartbeat, such as atrial fibrillation, supraventricular tachycardia, or ventricular tachycardia(VT). Your doctor used a brief electrical shock to reset your heart's rhythm. After the procedure, you may have redness, like a sunburn, where the patches were. The medicines you got to make you sleepy may make you feel drowsy for the rest of the day. You may feel soreness or discomfort in your chest wall for afew days. Your doctor may have you take medicines to help the heart beat normally and toprevent blood clots. This care sheet gives you a general idea about how long it will take for you to recover. But each person recovers at a different pace. Follow the steps belowto feel better as quickly as possible. How can you care for yourself at home? Medicines    If the doctor gave you a sedative: For 24 hours, don't do anything that requires attention to detail. It takes time for the medicine's effects to completely wear off. For your safety, do not drive or operate any machinery that could be dangerous. Wait until the medicine wears off and you can think clearly and react easily. Be safe with medicines. Take your medicines exactly as prescribed. Call your doctor if you think you are having a problem with your medicine. You may take one or more of the following medicines:  Rate-control medicines to slow the heart rate. Rhythm-control medicines that help the heart keep a normal rhythm. Blood thinners, also called anticoagulants, which help prevent blood clots. You will get more details on the specific medicines your doctor prescribes. Be sure you know how to take your medicines safely.      Do not take any vitamins, over-the-counter medicines, or herbal products without talking to your doctor first.   Exercise    Talk to your doctor about what type and level of exercise are safe for you.     When you exercise, watch for signs that your heart is working too hard. You are pushing too hard if you cannot talk while you are exercising. If you become short of breath or dizzy or have chest pain, sit down and rest right away. Check your pulse regularly. Place two fingers on the artery at the palm side of your wrist in line with your thumb. If your heartbeat seems uneven or fast, talk to your doctor. Heart-healthy lifestyle    Do not smoke. If you need help quitting, talk to your doctor about stop-smoking programs and medicines. These can increase your chances of quitting for good. Eat heart-healthy foods. Limit sodium, alcohol, and sugar. Stay at a healthy weight. Lose weight if you need to. Manage other health problems. If you think you may have a problem with alcohol or drug use, talk to your doctor. Follow-up care is a key part of your treatment and safety. Be sure to make and go to all appointments, and call your doctor if you are having problems. It's also a good idea to know your test results and keep alist of the medicines you take. When should you call for help? Call 911 anytime you think you may need emergency care. For example, call if:    You passed out (lost consciousness). You have symptoms of a heart attack. These may include:  Chest pain or pressure, or a strange feeling in the chest.  Sweating. Shortness of breath. Nausea or vomiting. Pain, pressure, or a strange feeling in the back, neck, jaw, or upper belly or in one or both shoulders or arms. Lightheadedness or sudden weakness. A fast or irregular heartbeat. After calling 911, the  may tell you to chew 1 adult-strength or 2 to 4 low-dose aspirin. Wait for an ambulance. Do not try to drive yourself. You have symptoms of a stroke. These may include:  Sudden numbness, tingling, weakness, or loss of movement in your face, arm, or leg, especially on only one side of your body.   Sudden vision changes. Sudden trouble speaking. Sudden confusion or trouble understanding simple statements. Sudden problems with walking or balance. A sudden, severe headache that is different from past headaches. Call your doctor now or seek immediate medical care if:    You feel dizzy or lightheaded, or you feel like you may faint. You have a fast or irregular heartbeat. Watch closely for any changes in your health, and be sure to contact yourdoctor if you have any problems. Where can you learn more? Go to https://[employer]. org and sign in to your [email or web address] account. Enter A617 in the [employer] box to learn more about \"Electrical Cardioversion: What to Expect at Home. \"     If you do not have an account, please click on the \"Sign Up Now\" link. Current as of: January 10, 2022               Content Version: 13.3  © 6769-6432 Healthwise, Incorporated. Care instructions adapted under license by [facility] ([facility]). If you have questions about a medical condition or this instruction, always ask your healthcare professional. [address] any warranty or liability for your use of this information.

## 2022-09-08 NOTE — ANESTHESIA PRE PROCEDURE
taking: Reported on 9/6/2022) 60 tablet 0     Current Facility-Administered Medications   Medication Dose Route Frequency Provider Last Rate Last Admin    lactated ringers infusion   IntraVENous Continuous Darylene Garnet,  mL/hr at 09/08/22 1101 NoRateChange at 09/08/22 1101       Allergies:  No Known Allergies    Problem List:    Patient Active Problem List   Diagnosis Code    Diverticulitis large intestine K57.32    Diverticulitis K57.92    colon resection Z90.49       Past Medical History:        Diagnosis Date    Acid reflux     Anxiety     Arrhythmia     ATRIAL FIBRILLATION    Diverticulitis     Hypertension        Past Surgical History:        Procedure Laterality Date    BACK SURGERY      laminectomy    COLECTOMY  July 2014    Laparoscopic sigmoid colectomy    COLONOSCOPY      TONSILLECTOMY         Social History:    Social History     Tobacco Use    Smoking status: Never    Smokeless tobacco: Never   Substance Use Topics    Alcohol use: Yes     Alcohol/week: 2.0 standard drinks     Types: 2 Cans of beer per week     Comment: 1 case of beer a week                                 Counseling given: Not Answered      Vital Signs (Current):   Vitals:    09/08/22 0947   BP: (!) 145/89   Pulse: 73   Resp: 15   Temp: 36.4 °C (97.5 °F)   TempSrc: Infrared   SpO2: 98%   Weight: 241 lb (109.3 kg)   Height: 5' 10\" (1.778 m)                                              BP Readings from Last 3 Encounters:   09/08/22 (!) 145/89   09/06/22 126/86   06/09/22 108/62       NPO Status: Time of last liquid consumption: 0800                        Time of last solid consumption: 1900                        Date of last liquid consumption: 09/08/22                        Date of last solid food consumption: 09/07/22    BMI:   Wt Readings from Last 3 Encounters:   09/08/22 241 lb (109.3 kg)   09/06/22 245 lb (111.1 kg)   06/09/22 245 lb (111.1 kg)     Body mass index is 34.58 kg/m².     CBC:   Lab Results Component Value Date/Time    WBC 6.9 11/29/2021 11:47 AM    RBC 5.75 11/29/2021 11:47 AM    HGB 17.1 11/29/2021 11:47 AM    HCT 50.4 11/29/2021 11:47 AM    MCV 87.7 11/29/2021 11:47 AM    RDW 12.2 11/29/2021 11:47 AM     11/29/2021 11:47 AM       CMP:   Lab Results   Component Value Date/Time     09/12/2017 10:19 AM    K 5.0 09/12/2017 10:19 AM     09/12/2017 10:19 AM    CO2 26 09/12/2017 10:19 AM    BUN 21 11/29/2021 11:47 AM    CREATININE 1.0 11/29/2021 11:47 AM    GFRAA >60 11/29/2021 11:47 AM    LABGLOM >60 11/29/2021 11:47 AM    GLUCOSE 101 11/29/2021 11:47 AM    PROT 7.8 11/29/2021 11:47 AM    CALCIUM 9.7 09/12/2017 10:19 AM    BILITOT 0.7 11/29/2021 11:47 AM    ALKPHOS 92 11/29/2021 11:47 AM    AST 20 11/29/2021 11:47 AM    ALT 22 11/29/2021 11:47 AM       POC Tests: No results for input(s): POCGLU, POCNA, POCK, POCCL, POCBUN, POCHEMO, POCHCT in the last 72 hours. Coags: No results found for: PROTIME, INR, APTT    HCG (If Applicable): No results found for: PREGTESTUR, PREGSERUM, HCG, HCGQUANT     ABGs: No results found for: PHART, PO2ART, UWL4DJK, UNA2CGG, BEART, V6QAIKLC     Type & Screen (If Applicable):  No results found for: LABABO, LABRH    Drug/Infectious Status (If Applicable):  No results found for: HIV, HEPCAB    COVID-19 Screening (If Applicable): No results found for: COVID19        Anesthesia Evaluation  Patient summary reviewed and Nursing notes reviewed no history of anesthetic complications:   Airway: Mallampati: II  TM distance: >3 FB   Neck ROM: full  Mouth opening: > = 3 FB   Dental: normal exam         Pulmonary:Negative Pulmonary ROS breath sounds clear to auscultation            Patient did not smoke on day of surgery.                  Cardiovascular:  Exercise tolerance: good (>4 METS),   (+) hypertension: moderate, dysrhythmias: atrial fibrillation,         Rhythm: irregular  Rate: normal                    Neuro/Psych:   (+) depression/anxiety GI/Hepatic/Renal:   (+) GERD: well controlled,           Endo/Other: Negative Endo/Other ROS                    Abdominal:       Abdomen: soft. Vascular: Other Findings:           Anesthesia Plan      MAC     ASA 3       Induction: intravenous. Anesthetic plan and risks discussed with patient. Plan discussed with attending.                     LUIS CARLOS Alvarez - CRNA   9/8/2022

## 2022-09-13 LAB
EKG ATRIAL RATE: 300 BPM
EKG Q-T INTERVAL: 414 MS
EKG QRS DURATION: 104 MS
EKG QTC CALCULATION (BAZETT): 423 MS
EKG R AXIS: 5 DEGREES
EKG T AXIS: -8 DEGREES
EKG VENTRICULAR RATE: 63 BPM

## 2022-09-13 NOTE — H&P
Department of Medicine  Section of Cardiology  Attending Procedure History and Physical        Pre-Procedural Diagnosis: Symptomatic atrial fibrillation    Indications: Symptomatic atrial fibrillation    Procedure Planned: DC cardioversion    History obtained from patient    History of Present Illness: The patient is a 59 y.o. male who presents for the above procedure. With asymptomatic atrial fibrillation, not responding well to medical therapy, is here for DC cardioversion    Past Medical History:        Diagnosis Date    Acid reflux     Anxiety     Arrhythmia     ATRIAL FIBRILLATION    Diverticulitis     Hypertension      Past Surgical History:        Procedure Laterality Date    BACK SURGERY      laminectomy    COLECTOMY  July 2014    Laparoscopic sigmoid colectomy    COLONOSCOPY      TONSILLECTOMY       Medications:    Current Outpatient Rx   Medication Sig Dispense Refill    pantoprazole (PROTONIX) 40 MG tablet take 1 tablet by mouth once daily      amiodarone (CORDARONE) 200 MG tablet Take 1 tablet by mouth See Admin Instructions Take 2 by mouth twice daily for 5 days, then one by mouth daily 30 tablet 0    XARELTO 20 MG TABS tablet take 1 tablet by mouth once daily      metoprolol succinate (TOPROL XL) 50 MG extended release tablet Take 1 tablet by mouth daily 90 tablet 3    losartan (COZAAR) 50 MG tablet 50 mg daily       sertraline (ZOLOFT) 100 MG tablet take 1 tablet by mouth once daily         Allergies:  Patient has no known allergies.     History of allergic reaction to anesthesia:  no    Social History  Non-smoker    REVIEW OF SYSTEMS  CONSTITUTIONAL:  negative for  fevers, chills  HEENT:  negative for earaches, nasal congestion and epistaxis  RESPIRATORY:  negative for  dry cough, cough with sputum,wheezing and hemoptysis  GASTROINTESTINAL:  negative for nausea, vomiting  MUSCULOSKELETAL:  negative for  myalgias, arthralgias  NEUROLOGICAL:  negative for visual disturbance, dysphagia PHYSICAL EXAM    /84   Pulse 73   Temp 97.6 °F (36.4 °C) (Infrared)   Resp 16   Ht 5' 10\" (1.778 m)   Wt 241 lb (109.3 kg)   SpO2 99%   BMI 34.58 kg/m²     CONSTITUTIONAL:  awake, alert, cooperative, no apparent distress, and appears stated age  HEAD:  normocepalic, without obvious abnormality, atraumatic  NECK:  Supple, symmetrical, trachea midline, no adenopathy, thyroid symmetric, not enlarged and no tenderness, skin normal  LUNGS:  No increased work of breathing, good air exchange, clear to auscultation bilaterally, no crackles or wheezing  CARDIOVASCULAR:  Normal apical impulse, irregularly irregular, normal S1 and S2, no S3 or S4, and no murmur noted, no edema, no JVD, no carotid bruit. ABDOMEN:  Soft, nontender, no masses, no hepatomegaly, no splenomegaly, BS+  MUSCULOSKELETAL:  No clubbing no cyanosis. there is no redness, warmth, or swelling of the joints  full range of motion noted  NEUROLOGIC:  Alert, awake,oriented x3  SKIN:  no bruising or bleeding, normal skin color, texture, turgor and no redness, warmth, or swelling    DATA    CBC:    Lab Results   Component Value Date/Time    WBC 6.9 11/29/2021 11:47 AM    RBC 5.75 11/29/2021 11:47 AM    HGB 17.1 11/29/2021 11:47 AM    HCT 50.4 11/29/2021 11:47 AM    MCV 87.7 11/29/2021 11:47 AM    RDW 12.2 11/29/2021 11:47 AM     11/29/2021 11:47 AM     Platelet:    Lab Results   Component Value Date/Time     11/29/2021 11:47 AM     BUN/Cr:    Lab Results   Component Value Date/Time    BUN 21 11/29/2021 11:47 AM     Potassium:    Lab Results   Component Value Date/Time    K 5.0 09/12/2017 10:19 AM     PT/INR:  No results found for: PTINR  PTT:  No results found for: APTT      ASSESSMENT AND PLAN    1. Patient is a 59 y.o. male with above specified procedure planned [facility]    Expected Sedation/Anesthesia Type:  deep sedation    2. [facility] ([employer] [facility]) Anesthesia Status:  1    3.   Procedure options, risks and benefits reviewed with Patient. Patient expresses understanding    4. Patient has been on anticoagulation medications to include Xarelto    5. Consent has been signed:   Yes

## 2022-09-16 RX ORDER — AMIODARONE HYDROCHLORIDE 200 MG/1
TABLET ORAL
Qty: 30 TABLET | Refills: 0 | Status: SHIPPED | OUTPATIENT
Start: 2022-09-16

## 2022-09-20 NOTE — PROGRESS NOTES
[facility] Cardiology consult  Dr. Alisa Koenig      Reason for Consult: Atrial Fibrillation  Referring Physician: Esperanza Hartley DO     CHIEF COMPLAINT:   Chief Complaint   Patient presents with    Atrial Fibrillation     3 month       HISTORY OF PRESENT ILLNESS:   Patient is 59year old male with newly diagnosed atrial fibrillation s/p cardioversion is here for follow-up appointment  Occasional palpitations, patient denies any chest pain, no shortness of breath, no lightheadedness, no dizziness, no pedal edema, no PND, no orthopnea, no syncope, no presyncopal episodes. Functional capacity is at baseline    Past Medical History:   Diagnosis Date    Acid reflux     Anxiety     Arrhythmia     ATRIAL FIBRILLATION    Diverticulitis     Hypertension          Past Surgical History:   Procedure Laterality Date    BACK SURGERY      laminectomy    COLECTOMY  July 2014    Laparoscopic sigmoid colectomy    COLONOSCOPY      TONSILLECTOMY           Current Outpatient Medications   Medication Sig Dispense Refill    pantoprazole (PROTONIX) 40 MG tablet take 1 tablet by mouth once daily      XARELTO 20 MG TABS tablet take 1 tablet by mouth once daily      metoprolol succinate (TOPROL XL) 50 MG extended release tablet Take 1 tablet by mouth daily 90 tablet 3    losartan (COZAAR) 50 MG tablet 50 mg daily       sertraline (ZOLOFT) 100 MG tablet take 1 tablet by mouth once daily      amiodarone (CORDARONE) 200 MG tablet take 2 tablets by mouth twice a day for 5 days then 1 tablet daily 30 tablet 0     No current facility-administered medications for this visit.          Allergies as of 09/06/2022    (No Known Allergies)       Social History     Socioeconomic History    Marital status:      Spouse name: Not on file    Number of children: Not on file    Years of education: Not on file    Highest education level: Not on file   Occupational History    Not on file   Tobacco Use    Smoking status: Never    Smokeless tobacco: Never   Vaping Use    Vaping Use: Never used   Substance and Sexual Activity    Alcohol use: Yes     Alcohol/week: 2.0 standard drinks     Types: 2 Cans of beer per week     Comment: 1 case of beer a week     Drug use: No    Sexual activity: Yes     Partners: Female   Other Topics Concern    Not on file   Social History Narrative    Not on file     Social Determinants of Health     Financial Resource Strain: Not on file   Food Insecurity: Not on file   Transportation Needs: Not on file   Physical Activity: Not on file   Stress: Not on file   Social Connections: Not on file   Intimate Partner Violence: Not on file   Housing Stability: Not on file     No family history of early CAD    REVIEW OF SYSTEMS:     CONSTITUTIONAL:  negative for  fevers, chills, sweats and fatigue  HEENT:  negative for  tinnitus, earaches, nasal congestion and epistaxis  RESPIRATORY:  negative for  dry cough, cough with sputum, dyspnea, wheezing and hemoptysis  GASTROINTESTINAL:  negative for nausea, vomiting, diarrhea, constipation, pruritus and jaundice  HEMATOLOGIC/LYMPHATIC:  negative for easy bruising, bleeding, lymphadenopathy and petechiae  ENDOCRINE:  negative for heat intolerance, cold intolerance, tremor, hair loss and diabetic symptoms including neither polyuria nor polydipsia nor blurred vision  MUSCULOSKELETAL:  negative for  myalgias, arthralgias, joint swelling, stiff joints and decreased range of motion  NEUROLOGICAL:  negative for memory problems, speech problems, visual disturbance, dysphagia, weakness and numbness      PHYSICAL EXAM:   CONSTITUTIONAL:  awake, alert, cooperative, no apparent distress, and appears stated age  HEAD:  normocepalic, without obvious abnormality, atraumatic, pink, moist mucous membranes.   NECK:  Supple, symmetrical, trachea midline, no adenopathy, thyroid symmetric, not enlarged and no tenderness, skin normal  HEMATOLOGIC/LYMPHATICS:  no cervical lymphadenopathy and no supraclavicular lymphadenopathy  LUNGS:  No increased work of breathing, good air exchange, clear to auscultation bilaterally, no crackles or wheezing  CARDIOVASCULAR:  Normal apical impulse, irregularly irregular, normal S1 and S2, no S3 or S4, no JVD, no carotid bruit, no pedal edema, good carotid upstroke bilaterally. ABDOMEN:  Soft, nontender, no masses, no hepatomegaly or splenomegaly, BS+  CHEST: nontender to palpation, expands symmetrically  MUSCULOSKELETAL:  No clubbing no cyanosis. there is no redness, warmth, or swelling of the joints  full range of motion noted  NEUROLOGIC:  Alert, awake,oriented x3. SKIN:  no bruising or bleeding, normal skin color, texture, turgor and no redness, warmth, or swelling    /86   Pulse 66   Resp 16   Ht 5' 10.5\" (1.791 m)   Wt 245 lb (111.1 kg)   BMI 34.66 kg/m²     DATA:   I personally reviewed the visit EKG with the following interpretation: Atrial fibrillation, controlled ventricular response, normal axis    EKG 11/29/21 Atrial fibrillation with PVC  Inferior infarct , age undetermined  Abnormal ECG  No previous ECGs available    ECHO: 3/15/2022,   Summary   No previous echo for comparison. Technically adequate study. Left ventricle size is normal.   Normal left ventricular wall thickness. Ejection fraction is visually estimated at 55%. Mild mitral regurgitation is present. Physiologic and/or trace tricuspid regurgitation.    RVSP is normal.  Stress Test  Angiography:   Cardiology Labs: BMP:    Lab Results   Component Value Date/Time     09/12/2017 10:19 AM    K 5.0 09/12/2017 10:19 AM     09/12/2017 10:19 AM    CO2 26 09/12/2017 10:19 AM    BUN 21 11/29/2021 11:47 AM    CREATININE 1.0 11/29/2021 11:47 AM     CMP:    Lab Results   Component Value Date/Time     09/12/2017 10:19 AM    K 5.0 09/12/2017 10:19 AM     09/12/2017 10:19 AM    CO2 26 09/12/2017 10:19 AM    BUN 21 11/29/2021 11:47 AM    CREATININE 1.0 11/29/2021 11:47 AM    PROT 7.8 11/29/2021 11:47 AM     CBC: Lab Results   Component Value Date/Time    WBC 6.9 11/29/2021 11:47 AM    RBC 5.75 11/29/2021 11:47 AM    HGB 17.1 11/29/2021 11:47 AM    HCT 50.4 11/29/2021 11:47 AM    MCV 87.7 11/29/2021 11:47 AM    RDW 12.2 11/29/2021 11:47 AM     11/29/2021 11:47 AM     PT/INR:  No results found for: PTINR  PT/INR Warfarin:  No components found for: PTPATWAR, PTINRWAR  PTT:  No results found for: APTT  PTT Heparin:  No components found for: APTTHEP  Magnesium:    Lab Results   Component Value Date/Time    MG 2.3 09/12/2017 10:19 AM     TSH:  No results found for: TSH  TROPONIN:  No components found for: TROP  BNP:  No results found for: BNP  FASTING LIPID PANEL:    Lab Results   Component Value Date/Time    CHOL 178 03/06/2013 05:30 AM    HDL 61 11/29/2021 11:47 AM    TRIG 80 03/06/2013 05:30 AM     No orders to display     I have personally reviewed the laboratory, cardiac diagnostic and radiographic testing as outlined above:      IMPRESSION:  1. Atrial fibrillation: Persistent, had cardioversion before, interested in having another cardioversion, will start on amiodarone, will attempt cardioversion in a couple of weeks, if it does not work will refer to EP for ablation  2. Mitral valve regurgitation: Mild  3. Hypertension: Controlled  4. Chronic anticoagulation    RECOMMENDATIONS:   1. Cardioversion   2. Amiodarone 200 mg by mouth twice daily   3. Continue current treatment  4. Recommend sleep studies to rule out sleep apnea  5. Follow-up with Dr. Lillian Sparrow as scheduled  6. Follow-up with Dr. Sung Casarez after cardioversion    I have reviewed my findings and recommendations with patient    Electronically signed by Delio Suresh MD on 9/20/2022 at 7:01 PM      NOTE: This report was transcribed using voice recognition software.  Every effort was made to ensure accuracy; however, inadvertent computerized transcription errors may be present

## 2022-09-29 ENCOUNTER — HOSPITAL ENCOUNTER (OUTPATIENT)
Age: 57
Discharge: HOME | End: 2022-09-29
Payer: COMMERCIAL

## 2022-09-29 DIAGNOSIS — I48.91 ATRIAL FIBRILLATION, UNSPECIFIED TYPE (HCC): ICD-10-CM

## 2022-09-29 LAB — TSH SERPL DL<=0.05 MIU/L-ACNC: 1.48 UIU/ML (ref 0.27–4.2)

## 2022-09-29 PROCEDURE — 36415 COLL VENOUS BLD VENIPUNCTURE: CPT

## 2022-09-29 PROCEDURE — 84443 ASSAY THYROID STIM HORMONE: CPT

## 2022-12-19 ENCOUNTER — HOSPITAL ENCOUNTER (OUTPATIENT)
Age: 57
Discharge: HOME | End: 2022-12-19
Payer: COMMERCIAL

## 2022-12-19 LAB
ALBUMIN SERPL-MCNC: 4.5 G/DL (ref 3.5–5.2)
ALP BLD-CCNC: 81 U/L (ref 40–129)
ALT SERPL-CCNC: 20 U/L (ref 0–40)
AST SERPL-CCNC: 17 U/L (ref 0–39)
BASOPHILS ABSOLUTE: 0.03 E9/L (ref 0–0.2)
BASOPHILS RELATIVE PERCENT: 0.5 % (ref 0–2)
BILIRUB SERPL-MCNC: 0.6 MG/DL (ref 0–1.2)
BILIRUBIN DIRECT: <0.2 MG/DL (ref 0–0.3)
BILIRUBIN, INDIRECT: NORMAL MG/DL (ref 0–1)
BUN BLDV-MCNC: 16 MG/DL (ref 6–20)
CHOLESTEROL, FASTING: 213 MG/DL (ref 0–199)
CREAT SERPL-MCNC: 0.9 MG/DL (ref 0.7–1.2)
EOSINOPHILS ABSOLUTE: 0.13 E9/L (ref 0.05–0.5)
EOSINOPHILS RELATIVE PERCENT: 2.2 % (ref 0–6)
GFR SERPL CREATININE-BSD FRML MDRD: >60 ML/MIN/1.73
HCT VFR BLD CALC: 47.1 % (ref 37–54)
HDLC SERPL-MCNC: 51 MG/DL
HEMOGLOBIN: 15.4 G/DL (ref 12.5–16.5)
IMMATURE GRANULOCYTES #: 0.06 E9/L
IMMATURE GRANULOCYTES %: 1 % (ref 0–5)
LDL CHOLESTEROL CALCULATED: 141 MG/DL (ref 0–99)
LYMPHOCYTES ABSOLUTE: 1.53 E9/L (ref 1.5–4)
LYMPHOCYTES RELATIVE PERCENT: 26.3 % (ref 20–42)
MCH RBC QN AUTO: 28.7 PG (ref 26–35)
MCHC RBC AUTO-ENTMCNC: 32.7 % (ref 32–34.5)
MCV RBC AUTO: 87.7 FL (ref 80–99.9)
MONOCYTES ABSOLUTE: 0.52 E9/L (ref 0.1–0.95)
MONOCYTES RELATIVE PERCENT: 8.9 % (ref 2–12)
NEUTROPHILS ABSOLUTE: 3.55 E9/L (ref 1.8–7.3)
NEUTROPHILS RELATIVE PERCENT: 61.1 % (ref 43–80)
PDW BLD-RTO: 12.5 FL (ref 11.5–15)
PLATELET # BLD: 215 E9/L (ref 130–450)
PMV BLD AUTO: 9.2 FL (ref 7–12)
RBC # BLD: 5.37 E12/L (ref 3.8–5.8)
TOTAL PROTEIN: 7.1 G/DL (ref 6.4–8.3)
TRIGLYCERIDE, FASTING: 104 MG/DL (ref 0–149)
VLDLC SERPL CALC-MCNC: 21 MG/DL
WBC # BLD: 5.8 E9/L (ref 4.5–11.5)

## 2022-12-19 PROCEDURE — 80076 HEPATIC FUNCTION PANEL: CPT

## 2022-12-19 PROCEDURE — 36415 COLL VENOUS BLD VENIPUNCTURE: CPT

## 2022-12-19 PROCEDURE — 82565 ASSAY OF CREATININE: CPT

## 2022-12-19 PROCEDURE — 84520 ASSAY OF UREA NITROGEN: CPT

## 2022-12-19 PROCEDURE — 85025 COMPLETE CBC W/AUTO DIFF WBC: CPT

## 2022-12-19 PROCEDURE — 80061 LIPID PANEL: CPT

## 2023-01-16 RX ORDER — METOPROLOL SUCCINATE 50 MG/1
TABLET, EXTENDED RELEASE ORAL
Qty: 90 TABLET | Refills: 3 | Status: SHIPPED | OUTPATIENT
Start: 2023-01-16

## 2023-01-18 ENCOUNTER — TELEPHONE (OUTPATIENT)
Dept: CARDIOLOGY CLINIC | Age: 58
End: 2023-01-18

## 2023-01-18 DIAGNOSIS — I48.91 ATRIAL FIBRILLATION, UNSPECIFIED TYPE (HCC): Primary | ICD-10-CM

## 2023-01-18 NOTE — TELEPHONE ENCOUNTER
Patient called saying that he feels ready to have his Ablation that you recommend for YOEL MUSCATINE. Is a referral needed for YOEL MUSCATINE?   Please advise

## 2023-01-25 NOTE — TELEPHONE ENCOUNTER
Medhat Boo  Would you please make him a referral to [facility], [facility] clinic electrophysiology, Dr. Eloy Cates

## 2023-02-01 ENCOUNTER — TELEPHONE (OUTPATIENT)
Dept: CARDIOLOGY CLINIC | Age: 58
End: 2023-02-01

## 2023-02-28 DIAGNOSIS — I48.91 ATRIAL FIBRILLATION AND FLUTTER (HCC): Primary | ICD-10-CM

## 2023-02-28 DIAGNOSIS — I48.92 ATRIAL FIBRILLATION AND FLUTTER (HCC): Primary | ICD-10-CM

## 2023-11-15 ENCOUNTER — TELEPHONE (OUTPATIENT)
Dept: SLEEP CENTER | Age: 58
End: 2023-11-15

## 2024-01-19 RX ORDER — METOPROLOL SUCCINATE 50 MG/1
TABLET, EXTENDED RELEASE ORAL
Qty: 90 TABLET | Refills: 3 | Status: SHIPPED | OUTPATIENT
Start: 2024-01-19

## 2024-03-04 ENCOUNTER — HOSPITAL ENCOUNTER (OUTPATIENT)
Dept: SLEEP CENTER | Age: 59
Discharge: HOME | End: 2024-03-04
Attending: FAMILY MEDICINE
Payer: COMMERCIAL

## 2024-03-04 DIAGNOSIS — G47.33 OSA (OBSTRUCTIVE SLEEP APNEA): Primary | ICD-10-CM

## 2024-03-04 PROCEDURE — 95811 POLYSOM 6/>YRS CPAP 4/> PARM: CPT
